# Patient Record
Sex: MALE | Race: WHITE
[De-identification: names, ages, dates, MRNs, and addresses within clinical notes are randomized per-mention and may not be internally consistent; named-entity substitution may affect disease eponyms.]

---

## 2017-02-20 ENCOUNTER — HOSPITAL ENCOUNTER (OUTPATIENT)
Dept: HOSPITAL 47 - RADXRMAIN | Age: 67
Discharge: HOME | End: 2017-02-20
Payer: COMMERCIAL

## 2017-02-20 DIAGNOSIS — R05: Primary | ICD-10-CM

## 2017-02-20 PROCEDURE — 71020: CPT

## 2017-02-20 NOTE — XR
EXAMINATION TYPE: XR chest 2V

 

DATE OF EXAM: 2/20/2017 8:36 AM

 

COMPARISON: None

 

HISTORY: 66-year-old male with chronic cough for a couple months

 

TECHNIQUE:  Frontal and lateral views

 

FINDINGS:  

The cardiomediastinal silhouette, aorta, and pulmonary vasculature are within normal limits. Some str
asyha atelectasis in the lower lungs. There is mild peribronchial cuffing noted centrally. No consolid
ation or pleural effusion.

 

 

IMPRESSION:  

Some central peribronchial cuffing could reflect bronchitis or chronic asthma.

## 2018-11-28 ENCOUNTER — HOSPITAL ENCOUNTER (OUTPATIENT)
Dept: HOSPITAL 47 - RADMRIMAIN | Age: 68
End: 2018-11-28
Attending: ORTHOPAEDIC SURGERY
Payer: MEDICARE

## 2018-11-28 DIAGNOSIS — M75.82: ICD-10-CM

## 2018-11-28 DIAGNOSIS — M25.812: ICD-10-CM

## 2018-11-28 DIAGNOSIS — S46.112A: ICD-10-CM

## 2018-11-28 DIAGNOSIS — S43.492A: ICD-10-CM

## 2018-11-28 DIAGNOSIS — M75.122: Primary | ICD-10-CM

## 2018-11-28 NOTE — MR
EXAMINATION TYPE: MR shoulder LT wo con

 

DATE OF EXAM: 11/28/2018

 

COMPARISON: None

 

HISTORY: Pain left shoulder

 

TECHNIQUE: Multiplanar, multisequence imaging of the left shoulder is performed without contrast.

 

FINDINGS:

 

Rotator Cuff: There is a high-grade full-thickness tear of the anterior and mid fibers of the suprasp
inatus measuring 1.1 cm with retraction and undulation of the supraspinatus abnormally high signal an
terior fibers 1.6 cm. Posterior fibers appear intact with increased signal relating to tendinopathy.

 

There is a low-grade partial-thickness bursal surface tear of the infraspinatus measuring approximate
ly 1 x 1.3 cm with bursal surface fraying of the fibers.

 

The infraspinatus is unremarkable in muscle volume and signal.

 

There is a partial thickness tear of the subscapularis with few mid fibers remaining and undulation o
f the more cranial fibers.

 

Acromioclavicular Joint: There is moderate acromioclavicular arthropathy with capsular hypertrophy an
d joint space narrowing as well as marginal osteophytes.

 

Glenohumeral Joint: Mild joint space narrowing is noted. There is a moderate joint effusion without i
nternal complexity. 

 

Labrum: There is a nondisplaced tear of the posterior inferior labrum. Remainder of the labrum appear
s grossly intact.

 

Biceps Tendon: There is a full-thickness tear of the long head of the biceps with absence in the bici
pital groove. Only fluid is seen where the intra-articular portion of the biceps should be seen.

 

Bone marrow signal: No focal abnormal marrow signal is appreciated.

 

Other: Moderate amount of fluid is seen within the subdeltoid/subacromial bursa as well as within the
 subcoracoid bursa.

 

IMPRESSION: 

1. High-grade full-thickness tear of the anterior and mid fibers of the supraspinatus with 1.6 cm ret
raction and undulation of the torn fibers and superimposed moderate tendinopathy.

2. Complete tear of the biceps with the long head of the biceps remaining extra-articular and only humaira
int fluid seen in the bicipital groove.

3. Low-grade partial-thickness bursal surface tear of infraspinatus.

4. Partial thickness tear of the subscapularis with mid fibers remaining.

5. Moderate joint effusion with fluid in the subacromial/subdeltoid bursa and subcoracoid bursa.

6. Nondisplaced tear of the posterior inferior labrum.

## 2019-01-23 NOTE — HP
HISTORY AND PHYSICAL



DATE OF SURGERY:

01/24/2019



Dashawn Calderon is a 68-year-old patient seen with progressive left shoulder pain.

Treatment options were discussed.  He elected to proceed with arthroscopy.  Consent

obtained.



PAST MEDICAL HISTORY:

1. Asthma.

2. Non-insulin-dependent diabetes.

3. Hypertension.



PAST SURGICAL HISTORY:

Gastric sleeve.



DAILY MEDICATIONS:

Celexa and Lotrel.



ALLERGIES:

PENICILLIN.



SOCIAL HISTORY:

Patient denies tobacco use.



PHYSICAL EVALUATION OF LEFT SHOULDER:

Flexion 160 degrees, abduction 150 degrees, external rotation 60 degrees with some pain

and weakness.  There is tenderness along the anterior lateral acromion and rotator cuff

insertion site.  Impingement sign is positive at 90 degrees.  Drop-arm sign is

positive.  Distal neurovascular exam is intact.



LEFT SHOULDER RADIOGRAPHS:

Left shoulder radiographs revealed a type 2 anterior acromion, cystic changes of the

tuberosity.  An MRI of the left shoulder revealed a retracted rotator cuff tear.



IMPRESSION:

1. Left shoulder impingement with rotator cuff tear.

2. Hypertension.



PLAN:

Left shoulder arthroscopy with subacromial decompression, probable arthroscopic rotator

cuff repair and debridement.





MMODL / IJN: [id number] / Job#: [id number]

## 2019-01-24 ENCOUNTER — HOSPITAL ENCOUNTER (OUTPATIENT)
Dept: HOSPITAL 47 - OR | Age: 69
Discharge: HOME | End: 2019-01-24
Attending: ORTHOPAEDIC SURGERY
Payer: MEDICARE

## 2019-01-24 VITALS — SYSTOLIC BLOOD PRESSURE: 141 MMHG | DIASTOLIC BLOOD PRESSURE: 82 MMHG | HEART RATE: 88 BPM

## 2019-01-24 VITALS — RESPIRATION RATE: 16 BRPM

## 2019-01-24 VITALS — TEMPERATURE: 97 F

## 2019-01-24 DIAGNOSIS — E03.9: ICD-10-CM

## 2019-01-24 DIAGNOSIS — I10: ICD-10-CM

## 2019-01-24 DIAGNOSIS — E66.9: ICD-10-CM

## 2019-01-24 DIAGNOSIS — S43.492A: ICD-10-CM

## 2019-01-24 DIAGNOSIS — Z79.890: ICD-10-CM

## 2019-01-24 DIAGNOSIS — M75.102: Primary | ICD-10-CM

## 2019-01-24 DIAGNOSIS — Z90.3: ICD-10-CM

## 2019-01-24 DIAGNOSIS — K21.9: ICD-10-CM

## 2019-01-24 DIAGNOSIS — E11.9: ICD-10-CM

## 2019-01-24 DIAGNOSIS — M94.212: ICD-10-CM

## 2019-01-24 DIAGNOSIS — M75.42: ICD-10-CM

## 2019-01-24 DIAGNOSIS — Z79.84: ICD-10-CM

## 2019-01-24 DIAGNOSIS — Z79.899: ICD-10-CM

## 2019-01-24 DIAGNOSIS — Z98.84: ICD-10-CM

## 2019-01-24 DIAGNOSIS — M19.012: ICD-10-CM

## 2019-01-24 DIAGNOSIS — M25.712: ICD-10-CM

## 2019-01-24 DIAGNOSIS — J45.909: ICD-10-CM

## 2019-01-24 DIAGNOSIS — Z88.0: ICD-10-CM
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
## 2019-01-24 LAB
GLUCOSE BLD-MCNC: 115 MG/DL (ref 75–99)
GLUCOSE BLD-MCNC: 99 MG/DL (ref 75–99)

## 2019-01-24 PROCEDURE — 64415 NJX AA&/STRD BRCH PLXS IMG: CPT

## 2019-01-24 PROCEDURE — 29826 SHO ARTHRS SRG DECOMPRESSION: CPT

## 2019-01-24 PROCEDURE — 29827 SHO ARTHRS SRG RT8TR CUF RPR: CPT

## 2019-01-24 PROCEDURE — 29824 SHO ARTHRS SRG DSTL CLAVICLC: CPT

## 2019-01-24 RX ADMIN — ONDANSETRON ONE MG: 2 INJECTION INTRAMUSCULAR; INTRAVENOUS at 07:20

## 2019-01-24 RX ADMIN — HYDROMORPHONE HYDROCHLORIDE PRN MG: 1 INJECTION, SOLUTION INTRAMUSCULAR; INTRAVENOUS; SUBCUTANEOUS at 09:15

## 2019-01-24 RX ADMIN — ONDANSETRON ONE MG: 2 INJECTION INTRAMUSCULAR; INTRAVENOUS at 06:50

## 2019-01-24 RX ADMIN — DEXTROSE ONE MG: 50 INJECTION, SOLUTION INTRAVENOUS at 07:20

## 2019-01-24 RX ADMIN — DEXTROSE ONE MG: 50 INJECTION, SOLUTION INTRAVENOUS at 06:50

## 2019-01-24 RX ADMIN — HYDROMORPHONE HYDROCHLORIDE PRN MG: 1 INJECTION, SOLUTION INTRAMUSCULAR; INTRAVENOUS; SUBCUTANEOUS at 09:23

## 2019-01-24 NOTE — P.OP
Date of Procedure: 01/24/19


Preoperative Diagnosis: 


Left shoulder impingement


Postoperative Diagnosis: 


1.  Left shoulder rotator cuff tear


2.  Left shoulder impingement


3.  Left shoulder acromioclavicular joint osteoarthritis





Procedure(s) Performed: 


1.  Left shoulder arthroscopic rotator cuff repair


2.  Left shoulder arthroscopic subacromial decompression


3.  Left shoulder arthroscopic Ciaran procedure





Implants: 


15.5 Arthrex swivel lock anchor


Anesthesia: GETA, regional (Interscalene block)


Surgeon: Paolo Jeffers


Assistant #1: Ho Ritter


Estimated Blood Loss (ml): 11


Pathology: none sent


Condition: stable


Disposition: PACU


Indications for Procedure: 


68-year-old patient seen with progressive left shoulder pain.  After having 

treatment options discussed, he elected to proceed with arthroscopy.


Operative Findings: 


see description of procedure


Description of Procedure: 








 Patient underwent an interscalene block by department of anesthesia.  The 

patient was then taken to the operative suite.  The patient underwent a general 

anesthetic by the department of anesthesia.  The patient was placed into a 

lateral position and secured.  There was appropriate padding of the bony 

prominence.  Left shoulder was then prepped and draped in normal sterile 

orthopedic fashion.  We placed the extremity in 10 pounds of longitudinal 

traction. A posterior incision was now made for a posterior working portal 

site. The trocar and cannula were inserted into the glenohumeral joint. 

Arthroscopy was initiated. Spinal needle was now inserted anteriorly, to 

ascertain the anterior working portal site.  An incision was now made in that 

area, a trocar was inserted followed by a probe.  There was absence long head 

biceps tendon.  There was some superficial fraying of the superior labrum.  

There was an obvious rotator cuff tear I could visualize from the glenohumeral 

side.  There were grade 1 chondromalacia changes of the humeral head and grade 1

/2 chondromalacia changes of the glenoid.  I debrided out that superficial 

labral tear.  Instruments were now removed from glenohumeral joint.


Utilizing the posterior working portal site, the trocar and cannula were 

inserted into the subacromial space. Arthroscopy initiated. I made an incision 

2 fingerbreadths lateral to the acromion. I introduced my trocar followed by my 

ArthroCare ablator. I now began ablating thick subacromial bursal tissue, which 

exposed the undersurface of the anterior acromion.  There was diminished 

subacromial space. There was a very prominent anterior acromion. A motorized 

bur was introduced and a subacromial decompression was performed. I also 

excised some osteophytes off the inferior aspect of the distal clavicle. The AC 

joint was visualized and noted to be fairly arthritic.  The motorized bur was 

introduced in the anterior portal site and a Ciaran procedure was performed 

without difficulty, decompressing the AC joint nicely. I turned my attention to 

the rotator cuff. There was a 22.5 cm tear of the anterior aspect distal 

supraspinatus.  There was a large component of this that was intrasubstance.  I 

debrided the margins getting down to stable tendon tissue.  I abraded the 

footprint with a motorized bur.  I repaired the intrasubstance component with 2 

[id number] single sutures.  I then passed and everted mattress suture 

followed by a suture link through good bites of rotator cuff tendon.  I now 

punched a hole at the abraded footprint site for anchor insertion.  The suture 

limbs were passed through and Arthrex 5.5 swivel lock anchor.  The eyelet was 

now inserted into the pre-punch hole.  Lang LEWIS tension the sutures 

appropriately and was held the anchor position he introduced anchor into our pre

-punch hole with good purchase noted.  Residual suture limbs were clipped.  The 

repair was probed and found to be stable.  I injected 1 mL Renue intra-

articular.  Instruments now removed from the portal sites. All portal sites 

were approximated with nylon suture. Sterile dressings were applied followed by 

a shoulder sling.  Ho LEWIS assisted in this complex case.  The 

patient was awakened, transferred to a bed, and taken to recovery in stable 

condition.

## 2019-01-25 NOTE — P.ONQ
Anesthesiology Proc Note - PNB





- Peripheral Nerve Block Performed


  ** Left Interscalene Single


Time Out Performed: Yes


Procedure Start Time: 06:55


Procedure Stop Time: 07:06


Indication: Acute Post-Operative Pain, Requested by physician


Sedation Type: Sedate with meaningful contact maintained


Preparation: Sterile Prep


Needle Size: 50mm (2")


Needle Gauge: 21


Technique: Ultrasound (ropi .5% 30cc)


Blood Aspirated: No


Pain Paresthesia on Injection Noted: No


Resistance on Injection: Normal


Events: Uneventful and Well Tolerated

## 2021-01-20 ENCOUNTER — HOSPITAL ENCOUNTER (OUTPATIENT)
Dept: HOSPITAL 47 - RADMRIMAIN | Age: 71
Discharge: HOME | End: 2021-01-20
Attending: ORTHOPAEDIC SURGERY
Payer: MEDICARE

## 2021-01-20 DIAGNOSIS — M25.412: ICD-10-CM

## 2021-01-20 DIAGNOSIS — Z98.890: ICD-10-CM

## 2021-01-20 DIAGNOSIS — M75.102: Primary | ICD-10-CM

## 2021-01-21 NOTE — MR
EXAMINATION TYPE: MR shoulder LT wo con

 

DATE OF EXAM: 1/20/2021

 

COMPARISON: 11/20/2018

 

HISTORY: Left shoulder pain for 6 months.

 

Multiplanar multiecho imaging of the left shoulder was performed without contrast.

 

There is moderate shoulder joint effusion. Glenoid coco appear intact. Subscapularis tendon is intac
t.

 

There is some spurring at the AC joint. There is metal artifact from surgery at the greater tuberosit
y of the humerus. There is very slight thickening and increased signal in the supraspinatus tendon. T
here is large defect in the rotator cuff on the anterior aspect of the humeral head with full-thickne
ss tear. This is best seen on the sagittal images.

 

There is no evidence of a fracture. I see no focal bone destruction. The biceps tendon shows some thi
nning. I see no biceps tendon tear.

 

IMPRESSION:

Shoulder joint effusion is smaller than old exam. There is apparent surgery at the greater tuberosity
 of the humerus and repair of the biceps tendon.

 

There is large rotator cuff tear with large defect on the anterior aspect of the humeral head. There 
is no significant retraction of the tendon. This appears unchanged compared to old exam.

## 2021-01-28 ENCOUNTER — HOSPITAL ENCOUNTER (OUTPATIENT)
Dept: HOSPITAL 47 - LABPAT | Age: 71
Discharge: HOME | End: 2021-01-28
Attending: ORTHOPAEDIC SURGERY
Payer: MEDICARE

## 2021-01-28 DIAGNOSIS — M75.42: Primary | ICD-10-CM

## 2021-01-28 LAB
ANION GAP SERPL CALC-SCNC: 7 MMOL/L
CELLS COUNTED: 100
CHLORIDE SERPL-SCNC: 102 MMOL/L (ref 98–107)
CO2 SERPL-SCNC: 30 MMOL/L (ref 22–30)
EOSINOPHIL # BLD MANUAL: 0.08 K/UL (ref 0–0.7)
ERYTHROCYTE [DISTWIDTH] IN BLOOD BY AUTOMATED COUNT: 4.13 M/UL (ref 4.3–5.9)
ERYTHROCYTE [DISTWIDTH] IN BLOOD: 14.6 % (ref 11.5–15.5)
HCT VFR BLD AUTO: 35.3 % (ref 39–53)
HGB BLD-MCNC: 11.2 GM/DL (ref 13–17.5)
LYMPHOCYTES # BLD MANUAL: 2.03 K/UL (ref 1–4.8)
MCH RBC QN AUTO: 27.2 PG (ref 25–35)
MCHC RBC AUTO-ENTMCNC: 31.8 G/DL (ref 31–37)
MCV RBC AUTO: 85.4 FL (ref 80–100)
MONOCYTES # BLD MANUAL: 0.16 K/UL (ref 0–1)
NEUTROPHILS NFR BLD MANUAL: 71 %
NEUTS SEG # BLD MANUAL: 5.54 K/UL (ref 1.3–7.7)
PLATELET # BLD AUTO: 263 K/UL (ref 150–450)
POTASSIUM SERPL-SCNC: 4.3 MMOL/L (ref 3.5–5.1)
SODIUM SERPL-SCNC: 139 MMOL/L (ref 137–145)
WBC # BLD AUTO: 7.8 K/UL (ref 3.8–10.6)

## 2021-01-28 PROCEDURE — 93005 ELECTROCARDIOGRAM TRACING: CPT

## 2021-01-28 PROCEDURE — 85025 COMPLETE CBC W/AUTO DIFF WBC: CPT

## 2021-01-28 PROCEDURE — 80051 ELECTROLYTE PANEL: CPT

## 2021-01-28 PROCEDURE — 36415 COLL VENOUS BLD VENIPUNCTURE: CPT

## 2021-02-17 NOTE — HP
HISTORY AND PHYSICAL



DATE OF SURGERY:

02/18/2021



Dashawn Calderon is a 70-year-old gentleman seen with progressive left shoulder pain.

We discussed options for treatment.  He elected to proceed with arthroscopy.  Consent

regarding the procedure was obtained.



PAST MEDICAL HISTORY:

Hypertension, non-insulin-dependent diabetes, asthma.



PAST SURGICAL HISTORY:

Left shoulder arthroscopy.



DAILY MEDICATIONS:

Amlodipine, benazepril, loratadine, metformin, omeprazole.



ALLERGIES:

PENICILLIN.



SOCIAL HISTORY:

He denies tobacco use.



PHYSICAL EVALUATION OF THE LEFT SHOULDER:

Flexion is 150 degrees. Abduction is 130 degrees. External rotation is 30 degrees with

pain and weakness.  Tenderness along the anterolateral acromion and rotator cuff

insertion site.  Impingement is positive at 80.  Drop-arm sign is positive.  Distal

neurovascular exam is intact.



RADIOGRAPHS:

Radiographs of the left shoulder show flat acromion. MRI of left shoulder shows large

rotator cuff tendon tear.



IMPRESSION:

1. Left shoulder impingement with rotator cuff tear.

2. Hypertension.

3. Hyperlipidemia.



PLAN:

Left shoulder arthroscopy, subacromial decompression, arthroscopic rotator cuff repair

and debridement.





MMODL / IJN: [id number] / Job#: [id number]

## 2021-02-18 ENCOUNTER — HOSPITAL ENCOUNTER (OUTPATIENT)
Dept: HOSPITAL 47 - OR | Age: 71
Discharge: HOME | End: 2021-02-18
Attending: ORTHOPAEDIC SURGERY
Payer: MEDICARE

## 2021-02-18 VITALS — SYSTOLIC BLOOD PRESSURE: 101 MMHG | DIASTOLIC BLOOD PRESSURE: 65 MMHG

## 2021-02-18 VITALS — BODY MASS INDEX: 36.9 KG/M2

## 2021-02-18 VITALS — TEMPERATURE: 97 F

## 2021-02-18 VITALS — RESPIRATION RATE: 16 BRPM

## 2021-02-18 VITALS — HEART RATE: 75 BPM

## 2021-02-18 DIAGNOSIS — Z99.89: ICD-10-CM

## 2021-02-18 DIAGNOSIS — J45.909: ICD-10-CM

## 2021-02-18 DIAGNOSIS — Z79.84: ICD-10-CM

## 2021-02-18 DIAGNOSIS — E11.9: ICD-10-CM

## 2021-02-18 DIAGNOSIS — Z98.84: ICD-10-CM

## 2021-02-18 DIAGNOSIS — M19.90: ICD-10-CM

## 2021-02-18 DIAGNOSIS — I10: ICD-10-CM

## 2021-02-18 DIAGNOSIS — E78.5: ICD-10-CM

## 2021-02-18 DIAGNOSIS — Z88.0: ICD-10-CM

## 2021-02-18 DIAGNOSIS — G47.33: ICD-10-CM

## 2021-02-18 DIAGNOSIS — M75.102: Primary | ICD-10-CM

## 2021-02-18 DIAGNOSIS — M25.812: ICD-10-CM

## 2021-02-18 DIAGNOSIS — M94.212: ICD-10-CM

## 2021-02-18 DIAGNOSIS — Z79.899: ICD-10-CM

## 2021-02-18 LAB
GLUCOSE BLD-MCNC: 101 MG/DL (ref 75–99)
GLUCOSE BLD-MCNC: 155 MG/DL (ref 75–99)

## 2021-02-18 PROCEDURE — 64415 NJX AA&/STRD BRCH PLXS IMG: CPT

## 2021-02-18 PROCEDURE — 76942 ECHO GUIDE FOR BIOPSY: CPT

## 2021-02-18 PROCEDURE — 29827 SHO ARTHRS SRG RT8TR CUF RPR: CPT

## 2021-02-18 PROCEDURE — 29826 SHO ARTHRS SRG DECOMPRESSION: CPT

## 2021-02-18 NOTE — P.ANPRN
Procedure Note - Anesthesia





- Nerve Block Performed


  ** Left Interscalene Single


Time Out Performed: Yes


Date of Procedure: 02/18/21


Procedure Start Time: 08:23


Procedure Stop Time: 08:28


Location of Patient: PreOp


Indication: Acute Post-Operative Pain, Requested by Surgeon


Sedation Type: Sedate with meaningful contact maintained


Preparation: Sterile Prep


Position: Supine


Needle Types: Pajunk


Needle Gauge: 21


Ultrasound used to visualize needle placement: Yes


Ultrasound used to observe medication spread: Yes


Blood Aspirated: No


Pain Paresthesia on Injection Noted: No


Resistance on Injection: Normal


Image Stored and Saved: Yes


Events: Uneventful and Well Tolerated (ropi .5% 20 cc plus dexamethasone 4mg)

## 2021-02-18 NOTE — P.OP
Date of Procedure: 02/18/21


Preoperative Diagnosis: 


Left shoulder impingement


Postoperative Diagnosis: 


1.  Left shoulder rotator cuff tear


2.  Left shoulder impingement





Procedure(s) Performed: 


1.  Left shoulder arthroscopic rotator cuff repair


2.  Left shoulder arthroscopic subacromial decompression





Anesthesia: GETA, regional (Interscalene block)


Surgeon: Paolo Jeffers


Assistant #1: Ho Ritter


Estimated Blood Loss (ml): 11


Pathology: none sent


Condition: stable


Disposition: PACU


Indications for Procedure: 


70-year-old gentleman seen with progressive left shoulder pain.  After treatment

options were discussed with him, he elected to proceed with arthroscopy.


Operative Findings: 


See description of procedure


Description of Procedure: 








 Patient underwent an interscalene block by department of anesthesia.  The p

atient was then taken to the operative suite.  The patient underwent a general 

anesthetic by the department of anesthesia.  The patient was placed into a 

lateral position and secured.  There was appropriate padding of the bony 

prominence.  Left shoulder was then prepped and draped in normal sterile 

orthopedic fashion.  We placed the extremity in 10 pounds of longitudinal 

traction. A posterior incision was now made for a posterior working portal site.

The trocar and cannula were inserted into the glenohumeral joint. Arthroscopy 

was initiated. Spinal needle was now inserted anteriorly, to ascertain the 

anterior working portal site.  An incision was now made in that area, a trocar 

was inserted followed by a probe.  There were some grade 1 chondromalacia 

changes diffusely about the glenohumeral joint with no osteochondral tears 

present.  The labrum was stable.  The biceps tendon was absent.  There was 

evidence of a massive rotator cuff tear visualized from glenohumeral joint.  At 

this point instruments removed from the glenohumeral joint.


Utilizing the posterior working portal site, the trocar and cannula were 

inserted into the subacromial space. Arthroscopy initiated. I made an incision 2

fingerbreadths lateral to the acromion. I introduced my trocar followed by my 

ArthroCare ablator. I now began ablating thick subacromial bursal tissue, which 

exposed the undersurface of the anterior acromion.  There was a residual area of

bony prominence along the medial aspect of the acromion.  I introduced a 

motorized shaver and performed a decompression of that area.  We now had 

complete decompression of subacromial space.  I turned my attention to the 

rotator cuff tendon.  There was a massive intrasubstance tear measuring 

approximately 5 cm.  At this point I began meticulously freeing up the margins 

and I was ultimately able to free it up enough for could approximate the tear.  

The area of residual repair revealed a stable appearing anchor with some 

residual suture was which I debrided out.  I now created an  portal site

anterior laterally.  I now began passing interrupted sutures through the large 

tendon tear with the assistance of Lang LEWIS.  I passed a total of 7 

sutures.  I now with this is of Lang LEWIS began preparing the tendon tied 1

suture at a time beginning proximally.  I tied all sutures no residual suture 

limbs were clipped.  We had a reasonable repair of this complex intrasubstance 

tear.  I injected 1 mL Renyte intra-articular.  Instruments now removed from the

portal sites. All portal sites were approximated with nylon suture. Sterile dr lima were applied followed by a shoulder sling.  Ho LEWIS assisted 

in this complex case.  The patient was awakened, transferred to a bed, and taken

to recovery in stable condition.